# Patient Record
Sex: FEMALE | Race: WHITE
[De-identification: names, ages, dates, MRNs, and addresses within clinical notes are randomized per-mention and may not be internally consistent; named-entity substitution may affect disease eponyms.]

---
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
## 2020-03-20 ENCOUNTER — HOSPITAL ENCOUNTER (EMERGENCY)
Dept: HOSPITAL 75 - ER | Age: 20
Discharge: HOME | End: 2020-03-20
Payer: MEDICAID

## 2020-03-20 VITALS — DIASTOLIC BLOOD PRESSURE: 85 MMHG | SYSTOLIC BLOOD PRESSURE: 129 MMHG

## 2020-03-20 VITALS — BODY MASS INDEX: 19.69 KG/M2 | WEIGHT: 111.11 LBS | HEIGHT: 62.99 IN

## 2020-03-20 DIAGNOSIS — N39.0: Primary | ICD-10-CM

## 2020-03-20 DIAGNOSIS — Z82.49: ICD-10-CM

## 2020-03-20 LAB
APTT PPP: (no result) S
BACTERIA #/AREA URNS HPF: (no result) /HPF
BILIRUB UR QL STRIP: (no result)
FIBRINOGEN PPP-MCNC: (no result) MG/DL
GLUCOSE UR STRIP-MCNC: NEGATIVE MG/DL
KETONES UR QL STRIP: (no result)
LEUKOCYTE ESTERASE UR QL STRIP: (no result)
NITRITE UR QL STRIP: POSITIVE
PH UR STRIP: 6.5 [PH] (ref 5–9)
PROT UR QL STRIP: (no result)
RBC #/AREA URNS HPF: (no result) /HPF
SP GR UR STRIP: 1.02 (ref 1.02–1.02)
SQUAMOUS #/AREA URNS HPF: (no result) /HPF

## 2020-03-20 PROCEDURE — 81000 URINALYSIS NONAUTO W/SCOPE: CPT

## 2020-03-20 PROCEDURE — 84703 CHORIONIC GONADOTROPIN ASSAY: CPT

## 2020-03-20 PROCEDURE — 87088 URINE BACTERIA CULTURE: CPT

## 2020-03-20 PROCEDURE — 99282 EMERGENCY DEPT VISIT SF MDM: CPT

## 2020-03-20 PROCEDURE — 87077 CULTURE AEROBIC IDENTIFY: CPT

## 2020-03-20 PROCEDURE — 87186 SC STD MICRODIL/AGAR DIL: CPT

## 2020-03-20 NOTE — ED GU-FEMALE
General


Stated Complaint:  URINE IN BLOOD


Source:  patient


Exam Limitations:  no limitations





History of Present Illness


Date Seen by Provider:  Mar 20, 2020


Time Seen by Provider:  22:24


Initial Comments


The patient presents to ER by private conveyance from home with chief complaint 

this morning she was having some mild dysuria and that's nights she's noticed s

ome pink tinged blood in her urine. She's not having any flank back pain. She 

denies nausea fevers chills abdominal pain. No history of surgeries. She is 

approximately 3 months postpartum. She does not have any significant medical 

history otherwise. She does not have routine bladder infections or history of 

kidney stones.





Allergies and Home Medications


Allergies


Coded Allergies:  


     No Known Drug Allergies (Unverified , 10/3/16)





Home Medications


No Active Prescriptions or Reported Meds





Patient Home Medication List


Home Medication List Reviewed:  Yes





Review of Systems


Review of Systems


Constitutional:  No chills, No diaphoresis


EENTM:  No ear discharge, No ear pain


Respiratory:  No cough, No short of breath


Cardiovascular:  No chest pain, No edema


Gastrointestinal:  No abdominal pain, No nausea, No vomiting


Genitourinary:  burning; denies discharge; dysuria; denies flank pain; hematuria


Pregnant:  No


Musculoskeletal:  No back pain, No joint pain


Skin:  No pruritus, No rash





Past Medical-Social-Family Hx


Patient Social History


Alcohol Use:  Denies Use


Recreational Drug Use:  No


Smoking Status:  Never a Smoker


Recent Foreign Travel:  No


Contact w/Someone Who Travel:  No


Recent Hopitalizations:  No





Past Medical History


Anxiety, Depression





Family Medical History


Heart Disease, Cancer, Diabetes





Physical Exam


Vital Signs





Vital Signs - First Documented








 3/20/20





 22:33


 


Temp 36.0


 


Pulse 103


 


Resp 18


 


B/P (MAP) 129/85 (100)


 


O2 Delivery Room Air





Capillary Refill :


Height, Weight, BMI


Height: 5'2"


Weight: 110lbs. oz. 49.861512ep; 20.12 BMI


Method:Stated


General Appearance:  WD/WN, no apparent distress


HEENT:  PERRL/EOMI, pharynx normal


Neck:  full range of motion, normal inspection


Cardiovascular:  normal peripheral pulses, regular rate, rhythm


Respiratory:  no respiratory distress, no accessory muscle use


Neurologic/Psychiatric:  alert, oriented x 3


Skin:  normal color, warm/dry





Progress/Results/Core Measures


Suspected Sepsis


SIRS


Temperature: 


Pulse:  


Respiratory Rate: 


 


Blood Pressure  / 


Mean:





Results/Orders


Lab Results





Laboratory Tests








Test


 3/20/20


22:42 Range/Units


 


 


Urine Color BROWN H  


 


Urine Clarity CLOUDY   


 


Urine pH 6.5  5-9  


 


Urine Specific Gravity 1.025 H 1.016-1.022  


 


Urine Protein 3+ H NEGATIVE  


 


Urine Glucose (UA) NEGATIVE  NEGATIVE  


 


Urine Ketones TRACE H NEGATIVE  


 


Urine Nitrite POSITIVE H NEGATIVE  


 


Urine Bilirubin 1+ H NEGATIVE  


 


Urine Urobilinogen 2.0  < = 1.0  MG/DL


 


Urine Leukocyte Esterase 2+ H NEGATIVE  


 


Urine RBC (Auto) 3+ H NEGATIVE  


 


Urine RBC TNTC H  /HPF


 


Urine WBC 10-25 H  /HPF


 


Urine Squamous Epithelial


Cells RARE 


  /HPF





 


Urine Crystals NONE   /LPF


 


Urine Bacteria MODERATE H  /HPF


 


Urine Casts NONE   /LPF


 


Urine Mucus NEGATIVE   /LPF


 


Urine Culture Indicated YES   








My Orders





Orders - AMOS CONDE


Ua Culture If Indicated (3/20/20 22:18)


Urine Pregnancy Bedside (3/20/20 22:18)


Urine Culture (3/20/20 22:42)





Vital Signs/I&O











 3/20/20





 22:33


 


Temp 36.0


 


Pulse 103


 


Resp 18


 


B/P (MAP) 129/85 (100)


 


O2 Delivery Room Air





Capillary Refill :


Progress Note :  


   Time:  22:39


Progress Note


Urinalysis and bedside pregnancy. Suspect UTI.





Departure


Impression





   Primary Impression:  


   UTI (urinary tract infection)


   Qualified Codes:  N30.01 - Acute cystitis with hematuria


Disposition:  01 HOME, SELF-CARE


Condition:  Stable





Departure-Patient Inst.


Decision time for Depature:  23:17


Referrals:  


[facility] (PCP/Family)


Primary Care Physician


Patient Instructions:  Urinary Tract Infection, Adult (DC)





Add. Discharge Instructions:  


Drink lots of fluids.


Tylenol 1000 mg every 8 hours as needed for pain.


Ibuprofen 800 mg every 8 hours as needed for pain.


AZO/Pyridium take as prescribed for up to 2-3 days to control urinary symptoms.


Macrobid one tablet twice a day for the next 10 days.


Scripts


Nitrofurantoin Monohyd/M-Cryst (Macrobid 100 mg Capsule) 100 Mg Capsule


1 TAB PO BID for 10 Days, #20 CAP 0 Refills


   Prov: AMOS CONDE         3/20/20











AMOS CONDE                 Mar 20, 2020 22:40

## 2020-12-14 ENCOUNTER — HOSPITAL ENCOUNTER (EMERGENCY)
Dept: HOSPITAL 75 - ER | Age: 20
Discharge: HOME | End: 2020-12-14
Payer: MEDICAID

## 2020-12-14 VITALS — SYSTOLIC BLOOD PRESSURE: 116 MMHG | DIASTOLIC BLOOD PRESSURE: 78 MMHG

## 2020-12-14 VITALS — HEIGHT: 61.81 IN | BODY MASS INDEX: 21.5 KG/M2 | WEIGHT: 116.84 LBS

## 2020-12-14 DIAGNOSIS — Z82.49: ICD-10-CM

## 2020-12-14 DIAGNOSIS — Z83.3: ICD-10-CM

## 2020-12-14 DIAGNOSIS — Z80.9: ICD-10-CM

## 2020-12-14 DIAGNOSIS — R10.30: Primary | ICD-10-CM

## 2020-12-14 DIAGNOSIS — K52.9: ICD-10-CM

## 2020-12-14 DIAGNOSIS — N30.91: ICD-10-CM

## 2020-12-14 LAB
ALBUMIN SERPL-MCNC: 4.4 GM/DL (ref 3.2–4.5)
ALP SERPL-CCNC: 103 U/L (ref 40–136)
ALT SERPL-CCNC: 17 U/L (ref 0–55)
AMYLASE SERPL-CCNC: 64 U/L (ref 25–125)
APTT PPP: YELLOW S
BACTERIA #/AREA URNS HPF: (no result) /HPF
BASOPHILS # BLD AUTO: 0.1 10^3/UL (ref 0–0.1)
BASOPHILS NFR BLD AUTO: 1 % (ref 0–10)
BILIRUB SERPL-MCNC: 0.3 MG/DL (ref 0.1–1)
BILIRUB UR QL STRIP: NEGATIVE
BUN/CREAT SERPL: 10
CALCIUM SERPL-MCNC: 8.9 MG/DL (ref 8.5–10.1)
CHLORIDE SERPL-SCNC: 108 MMOL/L (ref 98–107)
CO2 SERPL-SCNC: 21 MMOL/L (ref 21–32)
CREAT SERPL-MCNC: 0.71 MG/DL (ref 0.6–1.3)
EOSINOPHIL # BLD AUTO: 0.1 10^3/UL (ref 0–0.3)
EOSINOPHIL NFR BLD AUTO: 1 % (ref 0–10)
FIBRINOGEN PPP-MCNC: CLEAR MG/DL
GFR SERPLBLD BASED ON 1.73 SQ M-ARVRAT: > 60 ML/MIN
GLUCOSE SERPL-MCNC: 103 MG/DL (ref 70–105)
GLUCOSE UR STRIP-MCNC: NEGATIVE MG/DL
HCT VFR BLD CALC: 40 % (ref 35–52)
HGB BLD-MCNC: 13.5 G/DL (ref 11.5–16)
KETONES UR QL STRIP: (no result)
LEUKOCYTE ESTERASE UR QL STRIP: NEGATIVE
LIPASE SERPL-CCNC: 36 U/L (ref 8–78)
LYMPHOCYTES # BLD AUTO: 1.6 10^3/UL (ref 1–4)
LYMPHOCYTES NFR BLD AUTO: 17 % (ref 12–44)
MANUAL DIFFERENTIAL PERFORMED BLD QL: NO
MCH RBC QN AUTO: 29 PG (ref 25–34)
MCHC RBC AUTO-ENTMCNC: 34 G/DL (ref 32–36)
MCV RBC AUTO: 87 FL (ref 80–99)
MONOCYTES # BLD AUTO: 0.6 10^3/UL (ref 0–1)
MONOCYTES NFR BLD AUTO: 7 % (ref 0–12)
NEUTROPHILS # BLD AUTO: 7.1 10^3/UL (ref 1.8–7.8)
NEUTROPHILS NFR BLD AUTO: 75 % (ref 42–75)
NITRITE UR QL STRIP: NEGATIVE
PH UR STRIP: 5.5 [PH] (ref 5–9)
PLATELET # BLD: 331 10^3/UL (ref 130–400)
PMV BLD AUTO: 9.5 FL (ref 9–12.2)
POTASSIUM SERPL-SCNC: 3.7 MMOL/L (ref 3.6–5)
PROT SERPL-MCNC: 7.5 GM/DL (ref 6.4–8.2)
PROT UR QL STRIP: (no result)
RBC #/AREA URNS HPF: (no result) /HPF
SODIUM SERPL-SCNC: 141 MMOL/L (ref 135–145)
SP GR UR STRIP: >=1.03 (ref 1.02–1.02)
SQUAMOUS #/AREA URNS HPF: (no result) /HPF
WBC # BLD AUTO: 9.5 10^3/UL (ref 4.3–11)
WBC #/AREA URNS HPF: (no result) /HPF

## 2020-12-14 PROCEDURE — 82150 ASSAY OF AMYLASE: CPT

## 2020-12-14 PROCEDURE — 83690 ASSAY OF LIPASE: CPT

## 2020-12-14 PROCEDURE — 36415 COLL VENOUS BLD VENIPUNCTURE: CPT

## 2020-12-14 PROCEDURE — 80053 COMPREHEN METABOLIC PANEL: CPT

## 2020-12-14 PROCEDURE — 74176 CT ABD & PELVIS W/O CONTRAST: CPT

## 2020-12-14 PROCEDURE — 74018 RADEX ABDOMEN 1 VIEW: CPT

## 2020-12-14 PROCEDURE — 87088 URINE BACTERIA CULTURE: CPT

## 2020-12-14 PROCEDURE — 81000 URINALYSIS NONAUTO W/SCOPE: CPT

## 2020-12-14 PROCEDURE — 84703 CHORIONIC GONADOTROPIN ASSAY: CPT

## 2020-12-14 PROCEDURE — 85025 COMPLETE CBC W/AUTO DIFF WBC: CPT

## 2020-12-14 NOTE — ED ABDOMINAL PAIN
General


Chief Complaint:  Abdominal/GI Problems


Stated Complaint:  ABD PAIN


Nursing Triage Note:  


Patient states abdominal pain that began friday evening after eating pizza. Pt. 


states RLQ and umbilical pain and states eating makes it worse. She advised last




bowel movement was 2 hours ago and she has been experiencing diarrhea. She 


denies fever or chills but states she feels like she could vomit but never does.




She states ibuprofen at 2200 and tylenol prior to arrival.


Sepsis Screen:  No Definite Risk


Source of Information:  Patient





History of Present Illness


Date Seen by Provider:  Dec 14, 2020


Time Seen by Provider:  03:04


Initial Comments


PT ARRIVES VIA POV FROM HOME


C/O LOWER ABDOMINAL PAIN/SUPRAPUBIC PAIN SINCE FRIDAY NIGHT


NO RADIATION OF PAIN 


PAIN COMES AND GOES, BUT IS A "CONSTANT DULL ACHE, AND SOMETIMES A SHARP PAIN" 


NOTHING WORSENS OR IMPROVES PAIN 


+ NAUSEA, NO VOMITING. 


HAD SMALL, HARD BM A COUPLE OF HOURS AGO-HAS BEEN CONSTIPATED. 


NO URINARY SYMPTOMS AND VOIDING A NORMAL AMOUNT


NO FEVER


NO VAGINAL DISCHARGE OR PAIN WITH INTERCOURSE





NORMAL APPETITE--ATE CHICKEN AND NOODLE SOUP AROUND 2300 TONIGHT





TOOK IBUPROFEN X 1 AT 2230--NO RELIEF. 





NO HISTORY OF SIMILAR


NO PRIOR ABDOMINAL SURGERIES. 





LMP--MARCH. GETS DEPO PROVERA SHOTS, NEXT ONE IS DUE IN FEBRUARY. PT IS  AB

0





PCP: UNKNOWN DR IN [place]





Allergies and Home Medications


Allergies


Coded Allergies:  


     No Known Drug Allergies (Unverified , 10/3/16)





Home Medications


Nitrofurantoin Monohyd/M-Cryst 100 Mg Capsule, 1 TAB PO BID


   Prescribed by: AMOS CONDE on 3/20/20 1292





Patient Home Medication List


Home Medication List Reviewed:  Yes





Review of Systems


Review of Systems


Constitutional:  no symptoms reported; No chills, No diaphoresis, No dizziness, 

No fever


EENTM:  No Symptoms Reported


Respiratory:  No Symptoms Reported


Cardiovascular:  No Symptoms Reported


Gastrointestinal:  See HPI, Abdominal Pain, Constipated, Nausea; Denies Poor 

Appetite, Denies Poor Fluid Intake, Denies Vomiting


Genitourinary:  No Symptoms Reported; Denies Burning, Denies Discharge, Denies 

Frequency, Denies Flank Pain, Denies Pain, Denies Urgency


Musculoskeletal:  no symptoms reported; No back pain


Skin:  no symptoms reported


Psychiatric/Neurological:  No Symptoms Reported


Endocrine:  No Symptoms Reported


Hematologic/Lymphatic:  No Symptoms Reported





Past Medical-Social-Family Hx


Past Med/Social Hx:  Reviewed and Corrections made


Patient Social History


Alcohol Use:  Denies Use


Recreational Drug Use:  No


Smoking Status:  Never a Smoker


Recent Foreign Travel:  No


Contact w/Someone Who Travel:  No


Recent Infectious Disease Expo:  No


Recent Hopitalizations:  No





Seasonal Allergies


Seasonal Allergies:  No





Past Medical History


Surgeries:  No


Respiratory:  No


Cardiac:  No


Neurological:  No


Pregnant:  No


Hx :  1


Hx Para:  1


Hx Total # of Abortions (Sp):  0


Reproductive Disorders:  No


Female Reproductive Disorders:  Denies


Genitourinary:  No


Gastrointestinal:  No


Musculoskeletal:  Yes (chronic chest pain)


Endocrine:  No


HEENT:  No


Cancer:  No


Psychosocial:  Yes


Anxiety, Depression


Integumentary:  No


Blood Disorders:  Yes (IRON DEFICIENCY)





Family Medical History


Heart Disease, Cancer, Diabetes





Physical Exam


Vital Signs





Vital Signs - First Documented








 20





 03:05


 


Temp 36.8


 


Pulse 80


 


Resp 14


 


B/P (MAP) 109/69 (82)


 


Pulse Ox 100


 


O2 Delivery Room Air





Capillary Refill : Less Than 3 Seconds


Height/Weight/BMI


Height: 5'2"


Weight: 110lbs. oz. 49.268320ka; 21.00 BMI


Method:Stated


General Appearance:  WD/WN, no apparent distress, other (WALKS UPRIGHT AND MOVES

WITHOUT DIFFICULTY. )


Neck:  normal inspection


Respiratory:  normal breath sounds, no respiratory distress, no accessory muscle

use


Cardiovascular:  normal peripheral pulses, regular rate, rhythm


Gastrointestinal:  normal bowel sounds, soft, no organomegaly; No distended, No 

guarding, No rebound; tenderness (SUPRAPUBIC TENDERNESS); No hernia, No mass


Extremities:  normal inspection


Back:  normal inspection, no CVA tenderness


Neurologic/Psychiatric:  CNs II-XII nml as tested, no motor/sensory deficits, 

alert, normal mood/affect, oriented x 3


Skin:  normal color, warm/dry, tattoos/piercings





Progress/Results/Core Measures


Results/Orders


Lab Results





Laboratory Tests








Test


 20


02:48 20


02:58 Range/Units


 


 


Urine Color YELLOW    


 


Urine Clarity CLEAR    


 


Urine pH 5.5   5-9  


 


Urine Specific Gravity >=1.030   1.016-1.022  


 


Urine Protein 1+ H  NEGATIVE  


 


Urine Glucose (UA) NEGATIVE   NEGATIVE  


 


Urine Ketones TRACE H  NEGATIVE  


 


Urine Nitrite NEGATIVE   NEGATIVE  


 


Urine Bilirubin NEGATIVE   NEGATIVE  


 


Urine Urobilinogen 0.2   < = 1.0  MG/DL


 


Urine Leukocyte Esterase NEGATIVE   NEGATIVE  


 


Urine RBC (Auto) 1+ H  NEGATIVE  


 


Urine RBC 5-10 H   /HPF


 


Urine WBC 0-2    /HPF


 


Urine Squamous Epithelial


Cells 2-5 


 


  /HPF





 


Urine Crystals NONE    /LPF


 


Urine Bacteria FEW H   /HPF


 


Urine Casts NONE    /LPF


 


Urine Mucus SMALL H   /LPF


 


Urine Culture Indicated YES    


 


White Blood Count


 


 9.5 


 4.3-11.0


10^3/uL


 


Red Blood Count


 


 4.62 


 3.80-5.11


10^6/uL


 


Hemoglobin  13.5  11.5-16.0  g/dL


 


Hematocrit  40  35-52  %


 


Mean Corpuscular Volume  87  80-99  fL


 


Mean Corpuscular Hemoglobin  29  25-34  pg


 


Mean Corpuscular Hemoglobin


Concent 


 34 


 32-36  g/dL





 


Red Cell Distribution Width  12.2  10.0-14.5  %


 


Platelet Count


 


 331 


 130-400


10^3/uL


 


Mean Platelet Volume  9.5  9.0-12.2  fL


 


Immature Granulocyte % (Auto)  0   %


 


Neutrophils (%) (Auto)  75  42-75  %


 


Lymphocytes (%) (Auto)  17  12-44  %


 


Monocytes (%) (Auto)  7  0-12  %


 


Eosinophils (%) (Auto)  1  0-10  %


 


Basophils (%) (Auto)  1  0-10  %


 


Neutrophils # (Auto)


 


 7.1 


 1.8-7.8


10^3/uL


 


Lymphocytes # (Auto)


 


 1.6 


 1.0-4.0


10^3/uL


 


Monocytes # (Auto)


 


 0.6 


 0.0-1.0


10^3/uL


 


Eosinophils # (Auto)


 


 0.1 


 0.0-0.3


10^3/uL


 


Basophils # (Auto)


 


 0.1 


 0.0-0.1


10^3/uL


 


Immature Granulocyte # (Auto)


 


 0.0 


 0.0-0.1


10^3/uL


 


Sodium Level  141  135-145  MMOL/L


 


Potassium Level  3.7  3.6-5.0  MMOL/L


 


Chloride Level  108 H   MMOL/L


 


Carbon Dioxide Level  21  21-32  MMOL/L


 


Anion Gap  12  5-14  MMOL/L


 


Blood Urea Nitrogen  7  7-18  MG/DL


 


Creatinine


 


 0.71 


 0.60-1.30


MG/DL


 


Estimat Glomerular Filtration


Rate 


 > 60 


  





 


BUN/Creatinine Ratio  10   


 


Glucose Level  103    MG/DL


 


Calcium Level  8.9  8.5-10.1  MG/DL


 


Corrected Calcium  8.6  8.5-10.1  MG/DL


 


Total Bilirubin  0.3  0.1-1.0  MG/DL


 


Aspartate Amino Transf


(AST/SGOT) 


 19 


 5-34  U/L





 


Alanine Aminotransferase


(ALT/SGPT) 


 17 


 0-55  U/L





 


Alkaline Phosphatase  103    U/L


 


Total Protein  7.5  6.4-8.2  GM/DL


 


Albumin  4.4  3.2-4.5  GM/DL


 


Amylase Level  64    U/L


 


Lipase  36  8-78  U/L








My Orders





Orders - LISA FINNEY DO


Ed Iv/Invasive Line Start (20 03:05)


Urine Pregnancy Bedside (20 03:05)


Amylase (20 03:05)


Cbc With Automated Diff (20 03:05)


Comprehensive Metabolic Panel (20 03:05)


Lipase (20 03:05)


Ua Culture If Indicated (20 03:05)


Urine Culture (20 02:48)


Ct Abd/Pelvis Wo(Kidney Stone) (20 04:01)


Abdomen/Kub 1view (20 04:01)


Ed Iv/Invasive Line Start (20 04:01)


Ketorolac Injection (Toradol Injection) (20 04:01)


Ed Iv/Invasive Line Start (20 04:02)


Lactated Ringers (Lr 1000 Ml Iv Solution (20 04:15)





Medications Given in ED





Current Medications








 Medications  Dose


 Ordered  Sig/Elaine


 Route  Start Time


 Stop Time Status Last Admin


Dose Admin


 


 Lactated Ringer's  1,000 ml @ 


 0 mls/hr  Q0M ONCE


 IV  20 04:15


 20 04:16 DC 20 04:19


0 MLS/HR








Vital Signs/I&O











 20





 03:05


 


Temp 36.8


 


Pulse 80


 


Resp 14


 


B/P (MAP) 109/69 (82)


 


Pulse Ox 100


 


O2 Delivery Room Air














Blood Pressure Mean:                    82











Progress


Progress Note :  


Progress Note


GIVEN IV FLUIDS AND TORADOL WITH RELIEF OF PAIN





Diagnostic Imaging





Comments


ABDOMEN XRAYS--NO ACUTE PROCESS, PENDING RADIOLOGIST REVIEW





CT ABDOMEN/PELVIS--NO ABNORMALITIES OF KIDNEYS OR URINARY TRACT. NO EVIDENCE OF 

APPENDICITIS. AREAS OF MUCOSAL THICKENING AND PERICOLONIC FAT STRANDING INV

OLVING CECUM AND ASCENDING COLON WITH REGIONAL PERICECAL LYMPH NODES. 

QUESTIONABLE ADDITIONAL SEGMENT INVOLVING RECTOSIGMOID COLON. PER [facility] VIA 

FAX AT 0515


   Reviewed:  Reviewed by Me





Departure


Impression





   Primary Impression:  


   Abdominal pain


   Additional Impressions:  


   Urinary tract infection


   Hemorrhagic cystitis


   Colitis


Disposition:   HOME, SELF-CARE


Condition:  Improved





Departure-Patient Inst.


Referrals:  


NO,LOCAL PHYSICIAN (PCP/Family)


Primary Care Physician


Patient Instructions:  Acute Cystitis (DC), Blood in the Urine (Hematuria), 

Adult (DC), Colitis (DC), Severe Abdominal Pain, Adult (DC), Urinary Tract 

Infection, Adult (DC)





Add. Discharge Instructions:  


LOTS OF CLEAR LIQUIDS--WATER, BROTH, JELLO, GATORADE





NO FOOD UNTIL YOUR PAIN IS GONE, THEN ADD BRATS DIET TO CLEAR LIQUIDS--BANANAS, 

RICE, APPLESAUCE, TOAST, SALTINES





FOLLOW UP WITH YOUR DR IN [place] IN 2-3 DAYS FOR FURTHER CARE--CALL TODAY TO 

SCHEDULE APPOINTMENT





RETURN TO ER IF SYMPTOMS WORSEN





All discharge instructions reviewed with patient and/or family. Voiced understan

ding.


Scripts


Ciprofloxacin HCl (Ciprofloxacin HCl) 500 Mg Tablet


500 MG PO BID, #20 TAB


   Prov: LISA FINNEY K DO         20 


Ondansetron (Ondansetron Odt) 4 Mg Tab.rapdis


4 MG PO Q4H for Nausea/Vomiting, #10 TAB


   Prov: REGJACOBA K DO         20 


Phenazopyridine HCl (Pyridium) 200 Mg Tablet


1 TAB PO TID, #15 TAB


   Prov: REGJACOBA K DO         20 


Ketorolac Tromethamine (Ketorolac Tromethamine) 10 Mg Tablet


10 MG PO Q6H for Pain, #15 TAB


   Prov: REGLSIA K DO         20 


Metronidazole (Flagyl) 500 Mg Tablet


500 MG PO QID, #40 TAB


   Prov: REG K DO         20











JACOB FINNEYA K DO                 Dec 14, 2020 03:17

## 2020-12-14 NOTE — DIAGNOSTIC IMAGING REPORT
PROCEDURE: CT urinary tract, rule out kidney stone.



TECHNIQUE: Multiple contiguous axial images were obtained through

the abdomen and pelvis without the use of intravenous contrast.

Auto Exposure Controls were utilized during the CT exam to meet

ALARA standards for radiation dose reduction. 



INDICATION:  Flank pain. Suspected kidney stone. 



COMPARISON: None.



FINDINGS: Lung bases are clear. The liver, gallbladder, pancreas,

spleen, adrenals, kidneys, collecting systems, bladder and

appendix are negative on this noncontrast exam. The reproductive

structures are poorly delineated. No free intraperitoneal air or

fluid. No lymphadenopathy. No evidence of bowel obstruction.

Osseous structures are intact.



IMPRESSION: No acute CT findings in the abdomen or pelvis on this

noncontrast exam.









Dictated by: 



  Dictated on workstation # [id number]

## 2020-12-14 NOTE — DIAGNOSTIC IMAGING REPORT
INDICATION: Abdominal pain



COMPARISON: CT from the same date



TECHNIQUE: Single radiograph of the abdomen dated 12/14/2020



FINDINGS: Small amount of gas and stool is noted within the

colon. No dilated loops of small bowel. No differential air-fluid

levels. No free air. No suspicious calcifications. No acute

osseous abnormality.



IMPRESSION: No acute abnormality.



Dictated by: 



  Dictated on workstation # [id number]